# Patient Record
Sex: FEMALE | Race: BLACK OR AFRICAN AMERICAN | NOT HISPANIC OR LATINO | Employment: UNEMPLOYED
[De-identification: names, ages, dates, MRNs, and addresses within clinical notes are randomized per-mention and may not be internally consistent; named-entity substitution may affect disease eponyms.]

---
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
## 2023-08-10 NOTE — PROGRESS NOTES
Assessment & Plan   Niya was seen today for vocal outbursts.    Diagnoses and all orders for this visit:    Behavioral tic    Chronic mixed headache syndrome    Irritable bowel syndrome with both constipation and diarrhea    Maryan is having both motor and vocal tics. There is a family history of Tourette's.  Her tics have increased in the past month, particularly vocal tics and she is embarrassed by her swearing. She does have a follow up with neurology next week for her chronic headache syndrome. We discussed that her neurologist is best equipped to offer  Interventions for her increased frequency of tics.   To note, history of hypertension. Today, her BP is normal in the office.     Recommend her to follow up with GI regarding ongoing management of IBS with constipation and diarrhea. In the EHR I can see they have tried to phone her for follow up but haven't been able to leave a message. She finds the recent interventions have been helpful.         {Provider  Link to [facility] Help Grid :306441]      Follow Up  Return in about 2 months (around 8/7/2021) for next well child visit.    Vivian Chaparro MD        Subjective   Niya Blas is 15 y.o. and presents today for the following health issues   HPI   Maryan has had increased instances of tic behavior over the past few months. She relates it to when she is not taking her medication for IBS with constipation and diarrhea, hypscyamine.  She started for IBS symptoms and has been helpful. She notes that if she skips a few days she has experienced more neck movement tics.  Then when she restarted med her tics improved. However, recently, it seems to not be related to medication.  She has also had increased vocal tics.  She swears unexpectedly- this is against her typical nature and she is embarrassed by it.     Her family history is positive for Tourette's syndrome.     PMHx: chronic mixed headache syndrome, followed by neurology. IBS and dysunctional  bladder.  History of hypertension evaluated by cardiology- normal ECHO.    Social History: She just graduated HS.  She will be attending college in [place] for engineering. She has been a high achieving student with increased stress this spring with finishing up.   Her mother is now living in [place], for the past year she ahs been living with a guardian in [place] secondary to improved schooling options.       Objective    /82 (Patient Site: Left Arm, Patient Position: Sitting, Cuff Size: Adult Regular)   Pulse 88   Wt 173 lb 3.2 oz (78.6 kg)   LMP 06/03/2021 (Exact Date)   94 %ile (Z= 1.57) based on CDC (Girls, 2-20 Years) weight-for-age data using vitals from 6/7/2021.       Physical Exam  Alert, no acute distress. Patient appears well groomed and relaxed. There is good eye contact with the examiner. Mood seems euthymic; affect is congruent. No psychomotor agitation or retardation. No evidence for abnormal thought content.  Some insight is demonstrated. Speech is unpressured.  Neck is without thyromegaly.  Cardiac exam regular rate and rhythm, normal S1 and S2.                   Prednisone Counseling:  I discussed with the patient the risks of prolonged use of prednisone including but not limited to weight gain, insomnia, osteoporosis, mood changes, diabetes, susceptibility to infection, glaucoma and high blood pressure.  In cases where prednisone use is prolonged, patients should be monitored with blood pressure checks, serum glucose levels and an eye exam.  Additionally, the patient may need to be placed on GI prophylaxis, PCP prophylaxis, and calcium and vitamin D supplementation and/or a bisphosphonate.  The patient verbalized understanding of the proper use and the possible adverse effects of prednisone.  All of the patient's questions and concerns were addressed.

## 2023-11-26 ENCOUNTER — HEALTH MAINTENANCE LETTER (OUTPATIENT)
Age: 18
End: 2023-11-26

## 2024-10-01 PROBLEM — E66.3 OVERWEIGHT: Status: ACTIVE | Noted: 2020-11-23
